# Patient Record
Sex: FEMALE | Race: WHITE | NOT HISPANIC OR LATINO | Employment: FULL TIME | URBAN - METROPOLITAN AREA
[De-identification: names, ages, dates, MRNs, and addresses within clinical notes are randomized per-mention and may not be internally consistent; named-entity substitution may affect disease eponyms.]

---

## 2023-04-07 ENCOUNTER — HOSPITAL ENCOUNTER (OUTPATIENT)
Dept: DATA CONVERSION | Facility: HOSPITAL | Age: 43
End: 2023-04-07
Attending: PLASTIC SURGERY | Admitting: PLASTIC SURGERY

## 2023-04-07 DIAGNOSIS — L72.9 FOLLICULAR CYST OF THE SKIN AND SUBCUTANEOUS TISSUE, UNSPECIFIED: ICD-10-CM

## 2023-04-07 DIAGNOSIS — G25.81 RESTLESS LEGS SYNDROME: ICD-10-CM

## 2023-04-07 DIAGNOSIS — F32.A DEPRESSION, UNSPECIFIED: ICD-10-CM

## 2023-04-07 DIAGNOSIS — G43.909 MIGRAINE, UNSPECIFIED, NOT INTRACTABLE, WITHOUT STATUS MIGRAINOSUS: ICD-10-CM

## 2023-04-07 DIAGNOSIS — F41.9 ANXIETY DISORDER, UNSPECIFIED: ICD-10-CM

## 2023-04-07 DIAGNOSIS — K20.0 EOSINOPHILIC ESOPHAGITIS: ICD-10-CM

## 2023-04-07 DIAGNOSIS — L73.2 HIDRADENITIS SUPPURATIVA: ICD-10-CM

## 2023-04-07 DIAGNOSIS — K21.9 GASTRO-ESOPHAGEAL REFLUX DISEASE WITHOUT ESOPHAGITIS: ICD-10-CM

## 2023-04-07 DIAGNOSIS — E66.9 OBESITY, UNSPECIFIED: ICD-10-CM

## 2023-04-27 LAB
COMPLETE PATHOLOGY REPORT: NORMAL
CONVERTED CLINICAL DIAGNOSIS-HISTORY: NORMAL
CONVERTED FINAL DIAGNOSIS: NORMAL
CONVERTED FINAL REPORT PDF LINK TO COPY AND PASTE: NORMAL
CONVERTED GROSS DESCRIPTION: NORMAL

## 2023-09-06 VITALS — BODY MASS INDEX: 39.6 KG/M2 | HEIGHT: 62 IN | WEIGHT: 215.17 LBS

## 2023-09-14 NOTE — H&P
History of Present Illness:   Pregnant/Lactating:  ·  Are You Pregnant no   ·  Are You Currently Breastfeeding no     History Present Illness:  Reason for surgery: Right axillary sweat gland excision   HPI:    Bharath Carter is a 42 year old female with a PMH of GERD, anxiety/depression, restless leg syndrome, chronic headaches, and hidradenitis. She is s/p left groin  excision of hidradenitis with us on 1/31/23 and is here today for right axillary sweat gland excision.      Past Medical History: GERD, anxiety/depression, restless leg syndrome, chronic headaches, and hidradenitis      Past Surgical History: supraorbital and occipital nerve stimulator placement ([facility], 2016) s/p removal on 3/31/21, left groin hidradenitis excision      Medications: Reviewed in EMR      Allergies: Benadryl compazine, neosporin, reglan, vancomycin, Vicodin tuss, and norco      Family History: Noncontributory       Social History: Denies        Review of Systems  Gen: No fatigue, anorexia, insomnia, fever.   Eyes: No vision loss, double vision, drainage, eye pain.   ENT: No pharyngitis, dry mouth, no hearing changes or ear discharge  Cardiac: No chest pain, palpitations, syncope, near syncope.   Pulmonary: No shortness of breath, cough, hemoptysis.   Heme/lymph: No swollen glands, fever, bleeding.   GI: No abdominal pain, change in bowel habits, melena, hematemesis, hematochezia, nausea, vomiting, diarrhea.   : No discharge, dysuria, frequency, urgency, hematuria. Left groin wound healed by secondary intention  Endo: No polyuria or weight loss.   Musculoskeletal: Negative for any pain or loss of ROM/weakness  Skin: No rashes or lesions  Neuro: Normal speech, no numbness or weakness. No gait difficulties  Review of systems is otherwise negative unless stated above or in history of present illness.     Allergies:        Allergies:  ·  Neosporin : Other  ·  Vicodin  Tuss: Other  ·  vancomycin : Other  ·  Benadryl : Unknown  ·   Compazine : Unknown  ·  Reglan : Unknown       Intolerances:  ·  Norco : Nausea/Vomiting    Home Medication Review:   Home Medications Reviewed: yes     Impression/Procedure:   ·  Impression and Planned Procedure: Right axillary sweat gland excision       ERAS (Enhanced Recovery After Surgery):  ·  ERAS Patient: no       Physical Exam by System:    Constitutional: awake/alert/oriented x3, no distress,  alert and cooperative   Eyes: EOMI, clear sclera   ENMT: mucous membranes moist, no apparent injury,  no lesions seen   Head/Neck: Neck supple, no apparent injury   Respiratory/Thorax: unlabored respirations, on room  air   Cardiovascular: RRR   Gastrointestinal: Nondistended, soft, non-tender   Genitourinary: Left groin wound healed by secondary  intention   Musculoskeletal: ROM intact, no joint swelling, normal  strength   Extremities: normal extremities, no cyanosis edema,  contusions or wounds, no clubbing   Neurological: alert and oriented x3, intact senses,  motor, response and reflexes, normal strength   Psychological: Appropriate mood and behavior   Skin: Warm and dry, no lesions, no rashes     Consent:   COVID-19 Consent:  ·  COVID-19 Risk Consent Surgeon has reviewed key risks related to the risk of brodie COVID-19 and if they contract COVID-19 what the risks are.       Electronic Signatures:  Jocelyn Olvera (APRN-CNP)  (Signed 07-Apr-2023 06:31)   Authored: History of Present Illness, Allergies, Home  Medication Review, Impression/Procedure, ERAS, Physical Exam, Consent, Note Completion      Last Updated: 07-Apr-2023 06:31 by Jocelyn Olvera (APRN-CNP)

## 2023-10-02 NOTE — OP NOTE
Post Operative Note:     PreOp Diagnosis: Right axillary hidradenitis suppurativa   Post-Procedure Diagnosis: Right axillary hidradenitis  suppurativa   Procedure: 1. Excision and debridement to and including  subcutaneous tissue  2. complex closure  3.   4.   5.   Surgeon: Dr. Kristopher Desai   Resident/Fellow/Other Assistant: Jocelyn Olvera APRN/RNFA   Anesthesia: LMA   I.V. Fluids: 600 mL   Estimated Blood Loss (mL): 5 mL   Specimen: yes. pathology   Findings: Right axillary hidradenitis suppurativa   Patient Returned To/Condition: PACU/Stable   Drains and/or Catheters: 15 fr vanessa       Electronic Signatures:  Jocelyn Olvera (APRN-CNP)   (Signed 07-Apr-2023 09:09)   Authored: Post Operative Note, Note Completion  Kristopher Desai)   (Signed 07-Apr-2023 10:46)   Co-Signer: Post Operative Note, Note Completion    Last Updated: 07-Apr-2023 10:46 by Kristopher Desai)

## 2024-04-14 ENCOUNTER — HOSPITAL ENCOUNTER (EMERGENCY)
Facility: HOSPITAL | Age: 44
Discharge: HOME | End: 2024-04-14
Attending: EMERGENCY MEDICINE
Payer: MEDICAID

## 2024-04-14 ENCOUNTER — APPOINTMENT (OUTPATIENT)
Dept: RADIOLOGY | Facility: HOSPITAL | Age: 44
End: 2024-04-14
Payer: MEDICAID

## 2024-04-14 VITALS
HEART RATE: 103 BPM | OXYGEN SATURATION: 97 % | TEMPERATURE: 97.9 F | SYSTOLIC BLOOD PRESSURE: 156 MMHG | DIASTOLIC BLOOD PRESSURE: 98 MMHG | HEIGHT: 65 IN | RESPIRATION RATE: 18 BRPM | BODY MASS INDEX: 36.65 KG/M2 | WEIGHT: 220 LBS
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
## 2024-04-14 PROCEDURE — 2500000004 HC RX 250 GENERAL PHARMACY W/ HCPCS (ALT 636 FOR OP/ED): Performed by: EMERGENCY MEDICINE

## 2024-04-14 PROCEDURE — 90715 TDAP VACCINE 7 YRS/> IM: CPT | Performed by: EMERGENCY MEDICINE

## 2024-04-14 PROCEDURE — 70486 CT MAXILLOFACIAL W/O DYE: CPT

## 2024-04-14 PROCEDURE — 76377 3D RENDER W/INTRP POSTPROCES: CPT

## 2024-04-14 PROCEDURE — 70450 CT HEAD/BRAIN W/O DYE: CPT

## 2024-04-14 PROCEDURE — 70450 CT HEAD/BRAIN W/O DYE: CPT | Performed by: RADIOLOGY

## 2024-04-14 PROCEDURE — 99285 EMERGENCY DEPT VISIT HI MDM: CPT | Mod: 25 | Performed by: EMERGENCY MEDICINE

## 2024-04-14 PROCEDURE — 90471 IMMUNIZATION ADMIN: CPT | Performed by: EMERGENCY MEDICINE

## 2024-04-14 RX ORDER — ACETAMINOPHEN 325 MG/1
650 TABLET ORAL ONCE
Status: COMPLETED | OUTPATIENT
Start: 2024-04-14 | End: 2024-04-14

## 2024-04-14 RX ADMIN — TETANUS TOXOID, REDUCED DIPHTHERIA TOXOID AND ACELLULAR PERTUSSIS VACCINE, ADSORBED 0.5 ML: 5; 2.5; 8; 8; 2.5 SUSPENSION INTRAMUSCULAR at 10:02

## 2024-04-14 RX ADMIN — ACETAMINOPHEN 650 MG: 325 TABLET ORAL at 10:01

## 2024-04-14 ASSESSMENT — COLUMBIA-SUICIDE SEVERITY RATING SCALE - C-SSRS
1. IN THE PAST MONTH, HAVE YOU WISHED YOU WERE DEAD OR WISHED YOU COULD GO TO SLEEP AND NOT WAKE UP?: NO
6. HAVE YOU EVER DONE ANYTHING, STARTED TO DO ANYTHING, OR PREPARED TO DO ANYTHING TO END YOUR LIFE?: NO
2. HAVE YOU ACTUALLY HAD ANY THOUGHTS OF KILLING YOURSELF?: NO

## 2024-04-14 ASSESSMENT — LIFESTYLE VARIABLES
HAVE YOU EVER FELT YOU SHOULD CUT DOWN ON YOUR DRINKING: NO
EVER FELT BAD OR GUILTY ABOUT YOUR DRINKING: NO
EVER HAD A DRINK FIRST THING IN THE MORNING TO STEADY YOUR NERVES TO GET RID OF A HANGOVER: NO
HAVE PEOPLE ANNOYED YOU BY CRITICIZING YOUR DRINKING: NO
TOTAL SCORE: 0

## 2024-04-14 ASSESSMENT — PAIN SCALES - GENERAL
PAINLEVEL_OUTOF10: 0 - NO PAIN
PAINLEVEL_OUTOF10: 4
PAINLEVEL_OUTOF10: 5 - MODERATE PAIN

## 2024-04-14 ASSESSMENT — PAIN - FUNCTIONAL ASSESSMENT
PAIN_FUNCTIONAL_ASSESSMENT: 0-10
PAIN_FUNCTIONAL_ASSESSMENT: 0-10

## 2024-04-14 ASSESSMENT — PAIN DESCRIPTION - LOCATION: LOCATION: HEAD
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
## 2024-04-19 NOTE — OP NOTE
PREOPERATIVE DIAGNOSIS:  Hidradenitis of the right axilla.    POSTOPERATIVE DIAGNOSIS:  Hidradenitis of the right axilla.    OPERATION/PROCEDURE:  Excision of hidradenitis of the right axilla and closure with local  tissue rearrangement flaps.     SURGEON:  Kristopher Desai MD, DDS.    ASSISTANT(S):  There is no resident or fellow involved in this patient's care.  The  assistant is Melvin PATTON.     ANESTHESIA:  General orotracheal.    FINDINGS:  Multiple firm nodules and draining pus from the subcutaneous tissues  of the axilla, consistent with her known diagnosis of hidradenitis.     DESCRIPTION OF PROCEDURE:  The patient was placed on the operating room table in a supine  position.  General anesthesia is achieved via orotracheal intubation.   She was then prepped and draped in the usual fashion for procedures  of the axilla.  I began by planning out a 14 cm elliptical incision,  that is 14 cm anterior-posterior and 4 cm craniocaudal direction.  I  injected 10 mL of lidocaine with epinephrine for local anesthesia and  local hemostasis.  After waiting an appropriate period of time, I  began with a 15-blade making an incision of this 14 x 4 cm resection.   I carried the incision down through the skin and subcutaneous tissue  down to fascia and began elevating the soft tissue mass.  Note,  multiple firm nodules of hidradenitis in the subcutaneous fat are  identified.  These are contained abscess cavities.  My resection is  deep to all of that.  Once the specimen was removed, it is sent to  Pathology for investigation.  I now have a large defect in the  axilla.  I began by making a back cut on the posterior most extent of  the incision.  I then undermined at the level of Leidy's fascia, so  that I could mobilize and close the tissue primarily.  A 15-[place]  round fluted drain was placed and exits the lower portion of the  axilla in the midaxillary line.  By mobilizing the tissue, I am able  to close  without a defect, used multiple interrupted 2-0 PDS sutures.   I began by taking a bite in of  the deeper tissue to the level of the  fascia and then 2 bites in the more superficial layer, so that as I  tightened down the sutures, it eliminates dead space over the drain.  Next, we used interrupted 4-0 chromic sutures in the skin.  A dry  sterile dressing of Telfa and Tegaderm was placed.  The drain was  sutured in with 2-0 silk sutures, and the drain was placed on suction  with good results.  At the termination of the procedure, the patient  was extubated in the operating room and transferred to the recovery  room, awake and responsive.       Kristopher Desai MD, DDS    DD:  04/07/2023 10:38:21 EST  DT:  04/07/2023 10:50:37 EST  DICTATION NUMBER:  [id number]  INTERNAL JOB NUMBER:  [id number]    CC:  PCP UNKNOWN  Kristopher Desai MD, DDS, Fax: [phone]       Electronic Signatures:  Kristopher Desai) (Signed on 13-Apr-2023 12:13)   Authored   Unsigned, Draft (SYS GENERATED) (Entered on 07-Apr-2023 10:50)   Entered     Last Updated: 13-Apr-2023 12:13 by Kristopher Desai)